# Patient Record
Sex: MALE | Race: ASIAN | URBAN - METROPOLITAN AREA
[De-identification: names, ages, dates, MRNs, and addresses within clinical notes are randomized per-mention and may not be internally consistent; named-entity substitution may affect disease eponyms.]

---

## 2021-05-20 ENCOUNTER — OFFICE VISIT (OUTPATIENT)
Dept: ENT CLINIC | Age: 41
End: 2021-05-20
Payer: COMMERCIAL

## 2021-05-20 VITALS — HEART RATE: 68 BPM | DIASTOLIC BLOOD PRESSURE: 82 MMHG | SYSTOLIC BLOOD PRESSURE: 119 MMHG | TEMPERATURE: 97.5 F

## 2021-05-20 DIAGNOSIS — S09.22XA TRAUMATIC PERFORATION OF TYMPANIC MEMBRANE, LEFT, INITIAL ENCOUNTER: Primary | ICD-10-CM

## 2021-05-20 PROCEDURE — 99203 OFFICE O/P NEW LOW 30 MIN: CPT | Performed by: OTOLARYNGOLOGY

## 2021-05-20 ASSESSMENT — ENCOUNTER SYMPTOMS
FACIAL SWELLING: 0
SINUS PAIN: 0
RESPIRATORY NEGATIVE: 1
EYES NEGATIVE: 1
SORE THROAT: 0
TROUBLE SWALLOWING: 0
SINUS PRESSURE: 0
RHINORRHEA: 0
VOICE CHANGE: 0
ALLERGIC/IMMUNOLOGIC NEGATIVE: 1

## 2021-05-20 NOTE — PROGRESS NOTES
SUBJECTIVE:    Chief Complaint   Patient presents with    Ear Problem        Lenka Camilo is a 42 y.o. male    8 days ago, the patient went to a water park and jumped into the water and apparently did a pressure type placement of his fingers in his ears and release and then noted loss of hearing in his left ear with pressure sensation as well as tinnitus but no vertigo. No drainage has been occurring since and he continues to notice some muffled hearing on the left side. He has had no fever. There has been no vertigo. He is never had this problem previously. He does not smoke. He has not been on any therapy for this. No past medical history on file. No past surgical history on file. No family history on file. Social History     Tobacco Use    Smoking status: Never Smoker    Smokeless tobacco: Never Used   Substance Use Topics    Alcohol use: Not on file        Review of Systems:  Review of Systems   Constitutional: Negative. Negative for fever and unexpected weight change. HENT: Positive for ear pain, hearing loss and tinnitus. Negative for congestion, dental problem, drooling, ear discharge, facial swelling, mouth sores, nosebleeds, postnasal drip, rhinorrhea, sinus pressure, sinus pain, sneezing, sore throat, trouble swallowing and voice change. Eyes: Negative. Respiratory: Negative. Cardiovascular: Negative. Skin: Negative. Allergic/Immunologic: Negative. Neurological: Negative. Negative for dizziness, light-headedness and headaches. Hematological: Negative. Psychiatric/Behavioral: Negative. OBJECTIVE:  /82   Pulse 68   Temp 97.5 °F (36.4 °C)   Physical Exam  Vitals and nursing note reviewed. Constitutional:       General: He is not in acute distress. Appearance: Normal appearance. He is normal weight. He is not ill-appearing, toxic-appearing or diaphoretic. HENT:      Head: Normocephalic and atraumatic.       Right Ear: Tympanic membrane, ear canal and external ear normal. There is no impacted cerumen. Left Ear: Ear canal and external ear normal. There is no impacted cerumen. Ears:      Comments: There is a small 10% perforation of the tympanic membrane posteriorly and inferiorly on the left side. No drainage is noted and no inflammation is apparent. Tuning fork studies indicate a  mild conductive loss on the left. Nose: Nose normal. No congestion or rhinorrhea. Mouth/Throat:      Mouth: Mucous membranes are moist.      Pharynx: Oropharynx is clear. No oropharyngeal exudate or posterior oropharyngeal erythema. Eyes:      Extraocular Movements: Extraocular movements intact. Conjunctiva/sclera: Conjunctivae normal.      Pupils: Pupils are equal, round, and reactive to light. Comments: No nystagmus   Cardiovascular:      Rate and Rhythm: Normal rate and regular rhythm. Pulses: Normal pulses. Heart sounds: Normal heart sounds. Pulmonary:      Effort: Pulmonary effort is normal.   Musculoskeletal:      Cervical back: Normal range of motion and neck supple. No rigidity or tenderness. Lymphadenopathy:      Cervical: No cervical adenopathy. Skin:     General: Skin is warm and dry. Neurological:      General: No focal deficit present. Mental Status: He is alert. Mental status is at baseline. He is disoriented. Psychiatric:         Mood and Affect: Mood normal.         Behavior: Behavior normal.         Thought Content: Thought content normal.         Judgment: Judgment normal.          ASSESSMENT:    Findings are indicative of a traumatic perforation of left tympanic membrane    PLAN:     Is suggested that patient avoid water getting into his ear and avoid swimming. I suggested that he not use eardrops and that we observe the situation and see him again in approximately 3 weeks. It is unlikely that surgery would be indicated but it if it persists it might.     Mariela Peterson MD

## 2021-06-25 ENCOUNTER — OFFICE VISIT (OUTPATIENT)
Dept: ENT CLINIC | Age: 41
End: 2021-06-25
Payer: COMMERCIAL

## 2021-06-25 VITALS — HEART RATE: 70 BPM | DIASTOLIC BLOOD PRESSURE: 91 MMHG | TEMPERATURE: 97.1 F | SYSTOLIC BLOOD PRESSURE: 132 MMHG

## 2021-06-25 DIAGNOSIS — S09.22XA TRAUMATIC PERFORATION OF TYMPANIC MEMBRANE, LEFT, INITIAL ENCOUNTER: Primary | ICD-10-CM

## 2021-06-25 PROCEDURE — 99213 OFFICE O/P EST LOW 20 MIN: CPT | Performed by: OTOLARYNGOLOGY

## 2021-06-25 NOTE — PROGRESS NOTES
Patient is here for follow-up regarding traumatic perforation of the left tympanic membrane. He has had no drainage and claims that it seems to be somewhat better although not markedly so. He has had no vertigo no tinnitus. Currently, he appears in no distress. The right ear reveals a normal-appearing tympanic membrane and ear canal.  Oral examination is unremarkable. The nasal mucosa is mildly edematous consistent with some allergy but there is no infection or drainage or obstruction present. The neck is free of adenopathy, mass, thyroid enlargement. The left ear reveals the 10% perforation present with no drainage. The margins of this were treated with topical phenol to stimulate healing. I have discussed with him the likelihood that this will heal on its own and not require surgery but I have described what the surgery might consist of. He will return in 1 month.